# Patient Record
Sex: MALE | Race: WHITE | Employment: FULL TIME | URBAN - METROPOLITAN AREA
[De-identification: names, ages, dates, MRNs, and addresses within clinical notes are randomized per-mention and may not be internally consistent; named-entity substitution may affect disease eponyms.]

---

## 2020-06-16 ENCOUNTER — HOSPITAL ENCOUNTER (EMERGENCY)
Age: 50
Discharge: HOME | End: 2020-06-16
Attending: EMERGENCY MEDICINE
Payer: COMMERCIAL

## 2020-06-16 VITALS
SYSTOLIC BLOOD PRESSURE: 125 MMHG | TEMPERATURE: 98.8 F | WEIGHT: 175 LBS | RESPIRATION RATE: 18 BRPM | HEART RATE: 67 BPM | DIASTOLIC BLOOD PRESSURE: 81 MMHG | OXYGEN SATURATION: 97 %

## 2020-06-16 LAB
A/G RATIO: 1.3 (ref 1.1–2.2)
ALBUMIN SERPL-MCNC: 4.1 G/DL (ref 3.4–5)
ALP BLD-CCNC: 70 U/L (ref 40–129)
ALT SERPL-CCNC: 29 U/L (ref 10–40)
ANION GAP SERPL CALCULATED.3IONS-SCNC: 10 MMOL/L (ref 3–16)
AST SERPL-CCNC: 22 U/L (ref 15–37)
BASOPHILS ABSOLUTE: 0 K/UL (ref 0–0.2)
BASOPHILS RELATIVE PERCENT: 0.5 %
BILIRUB SERPL-MCNC: <0.2 MG/DL (ref 0–1)
BILIRUBIN URINE: NEGATIVE
BLOOD, URINE: NEGATIVE
BUN BLDV-MCNC: 17 MG/DL (ref 7–20)
CALCIUM SERPL-MCNC: 9.6 MG/DL (ref 8.3–10.6)
CHLORIDE BLD-SCNC: 104 MMOL/L (ref 99–110)
CLARITY: CLEAR
CO2: 25 MMOL/L (ref 21–32)
COLOR: YELLOW
CREAT SERPL-MCNC: 1 MG/DL (ref 0.9–1.3)
EOSINOPHILS ABSOLUTE: 0.1 K/UL (ref 0–0.6)
EOSINOPHILS RELATIVE PERCENT: 2 %
GFR AFRICAN AMERICAN: >60
GFR NON-AFRICAN AMERICAN: >60
GLOBULIN: 3.1 G/DL
GLUCOSE BLD-MCNC: 110 MG/DL (ref 70–99)
GLUCOSE URINE: NEGATIVE MG/DL
HCT VFR BLD CALC: 40.6 % (ref 40.5–52.5)
HEMOGLOBIN: 14 G/DL (ref 13.5–17.5)
KETONES, URINE: NEGATIVE MG/DL
LACTIC ACID: 1.3 MMOL/L (ref 0.4–2)
LEUKOCYTE ESTERASE, URINE: NEGATIVE
LYMPHOCYTES ABSOLUTE: 1.5 K/UL (ref 1–5.1)
LYMPHOCYTES RELATIVE PERCENT: 25.5 %
MCH RBC QN AUTO: 32.2 PG (ref 26–34)
MCHC RBC AUTO-ENTMCNC: 34.5 G/DL (ref 31–36)
MCV RBC AUTO: 93.3 FL (ref 80–100)
MICROSCOPIC EXAMINATION: NORMAL
MONOCYTES ABSOLUTE: 0.6 K/UL (ref 0–1.3)
MONOCYTES RELATIVE PERCENT: 10.4 %
NEUTROPHILS ABSOLUTE: 3.7 K/UL (ref 1.7–7.7)
NEUTROPHILS RELATIVE PERCENT: 61.6 %
NITRITE, URINE: NEGATIVE
PDW BLD-RTO: 13 % (ref 12.4–15.4)
PH UA: 6 (ref 5–8)
PLATELET # BLD: 245 K/UL (ref 135–450)
PMV BLD AUTO: 7 FL (ref 5–10.5)
POTASSIUM REFLEX MAGNESIUM: 3.8 MMOL/L (ref 3.5–5.1)
PROTEIN UA: NEGATIVE MG/DL
RBC # BLD: 4.35 M/UL (ref 4.2–5.9)
SODIUM BLD-SCNC: 139 MMOL/L (ref 136–145)
SPECIFIC GRAVITY UA: 1.02 (ref 1–1.03)
TOTAL PROTEIN: 7.2 G/DL (ref 6.4–8.2)
URINE REFLEX TO CULTURE: NORMAL
URINE TYPE: NORMAL
UROBILINOGEN, URINE: 0.2 E.U./DL
WBC # BLD: 6 K/UL (ref 4–11)

## 2020-06-16 PROCEDURE — 99283 EMERGENCY DEPT VISIT LOW MDM: CPT

## 2020-06-16 PROCEDURE — 2500000003 HC RX 250 WO HCPCS: Performed by: NURSE PRACTITIONER

## 2020-06-16 PROCEDURE — 81003 URINALYSIS AUTO W/O SCOPE: CPT

## 2020-06-16 PROCEDURE — 83605 ASSAY OF LACTIC ACID: CPT

## 2020-06-16 PROCEDURE — 80053 COMPREHEN METABOLIC PANEL: CPT

## 2020-06-16 PROCEDURE — 2580000003 HC RX 258: Performed by: NURSE PRACTITIONER

## 2020-06-16 PROCEDURE — 87040 BLOOD CULTURE FOR BACTERIA: CPT

## 2020-06-16 PROCEDURE — 96365 THER/PROPH/DIAG IV INF INIT: CPT

## 2020-06-16 PROCEDURE — 85025 COMPLETE CBC W/AUTO DIFF WBC: CPT

## 2020-06-16 RX ORDER — 0.9 % SODIUM CHLORIDE 0.9 %
1000 INTRAVENOUS SOLUTION INTRAVENOUS ONCE
Status: DISCONTINUED | OUTPATIENT
Start: 2020-06-16 | End: 2020-06-16

## 2020-06-16 RX ORDER — DOXYCYCLINE HYCLATE 100 MG
TABLET ORAL
COMMUNITY
Start: 2020-06-15

## 2020-06-16 RX ORDER — CLINDAMYCIN HYDROCHLORIDE 150 MG/1
300 CAPSULE ORAL 4 TIMES DAILY
Qty: 56 CAPSULE | Refills: 0 | Status: SHIPPED | OUTPATIENT
Start: 2020-06-16 | End: 2020-06-23

## 2020-06-16 RX ADMIN — DEXTROSE MONOHYDRATE 600 MG: 50 INJECTION, SOLUTION INTRAVENOUS at 23:00

## 2020-06-16 ASSESSMENT — ENCOUNTER SYMPTOMS
SHORTNESS OF BREATH: 0
ABDOMINAL PAIN: 0
BACK PAIN: 0
COLOR CHANGE: 1
DIARRHEA: 0
COUGH: 0
VOMITING: 0
NAUSEA: 0
WHEEZING: 0

## 2020-06-17 NOTE — ED PROVIDER NOTES
**[facility]ERS[facility]  [facility]  EMERGENCY DEPARTMENT ENCOUNTER      Pt Name: Elba FOX:[id number]  [place] 1970  Date of evaluation: 6/16/2020  Provider: JON Frost CNP      Chief Complaint:    Chief Complaint   Patient presents with    Wound Check     pt has cellulitis to L upper leg, was on bactrim and switched to doxycycline, been on doxy for two days and seems to be getting worse        Nursing Notes, Past Medical Hx, Past Surgical Hx, Social Hx, Allergies, and Family Hx were all reviewed and agreed with or any disagreements were addressed in the HPI.    HPI:  (Location, Duration, Timing, Severity, Quality, Assoc Sx, Context, Modifying factors)  This is a  44 y.o. male who presents emergency department with cellulitis to the left upper medial thigh, patient states that he has had it for the past 5 days, he was started on Bactrim initially however two days ago was changed over to doxycycline. There was concern by the PCP that the cellulitis is only getting worse. He states initially that he thought it was just a bug bite as the area was about the size of a golf ball however it is now spread to the entire medial thigh, to the middle aspect of his anterior thigh and spreads to the posterior thigh, does not involve the lateral aspect of his left thigh, no involvement of the knee. No redness erythema or involvement of the inguinal area, scrotal region or penis. He denies any pain on exam.  No fever or chills, no cough or congestion. No nausea vomiting or diarrhea. PCP was concerned that the cellulitis is not getting better and failing outpatient treatment. He denies any additional complaints, no additional aggravating or relieving factors. Patient presents awake, alert and in no acute distress or toxic appearance. PastMedical/Surgical History:  History reviewed. No pertinent past medical history. History reviewed.  No less than 2 seconds. Coloration: Skin is not pale. Findings: Erythema present. Comments: Patient has erythema and warmth to the entire medial thigh, to the middle aspect of his anterior thigh and spreads to the posterior thigh, does not involve the lateral aspect of his left thigh, no involvement of the knee. No redness erythema or involvement of the inguinal area, scrotal region or penis. He denies any pain on exam.  No vesicles or pustules. No open wounds or lacerations. Neurological:      General: No focal deficit present. Mental Status: He is alert and oriented to person, place, and time.    Psychiatric:         Mood and Affect: Mood normal.         Behavior: Behavior normal.         MEDICAL DECISION MAKING    Vitals:    Vitals:    06/16/20 2227 06/16/20 2228 06/16/20 2229 06/16/20 2336   BP:  (!) 147/92  125/81   Pulse:  74  67   Resp:  18  18   Temp:   98.8 °F (37.1 °C)    TempSrc:   Oral    SpO2:  98%  97%   Weight: 175 lb (79.4 kg)          LABS:  Labs Reviewed   COMPREHENSIVE METABOLIC PANEL W/ REFLEX TO MG FOR LOW K - Abnormal; Notable for the following components:       Result Value    Glucose 110 (*)     All other components within normal limits    Narrative:     Performed at:  [place] [address] [address]   Phone [phone]   CULTURE, BLOOD 1   CULTURE, BLOOD 2   CBC WITH AUTO DIFFERENTIAL    Narrative:     Performed at:  [place] [address] [address]   Phone [phone]   LACTIC ACID, PLASMA    Narrative:     Performed at:  [place] [address] [address]   Phone [phone]   URINE RT REFLEX TO CULTURE    Narrative:     Performed at:  [place] [address] [address]   Phone  of labs reviewed and werenegative at this time or not returned at the time of this note. RADIOLOGY:   Non-plain film images such as CT, Ultrasound and MRI are read by the radiologist. Kellen FALL, JON - CNP have directly visualized the radiologic plain film image(s) with the below findings:        Interpretation per the Radiologist below, if available at the time of this note:    No orders to display        No results found. MEDICAL DECISION MAKING / ED COURSE:      PROCEDURES:   Procedures    None    Patient was given:  Medications   clindamycin (CLEOCIN) 600 mg in dextrose 5% 50 mL IVPB (0 mg Intravenous Stopped 6/16/20 1165)       Patient complains of cellulitis to the left medial thigh, has been dealing with this for the past week, has been on Bactrim and now on doxycycline but not getting any better. After evaluation and examination patient I ordered IV access, IV fluids, blood work, and urinalysis and clindamycin. He has no pain on exam.  No involvement of the perineum, scrotum or penis, little concern for perineal abscess. CBC shows no acute sepsis or anemia. Metabolic panel shows no acute electrolyte disturbances or renal insufficiency. Liver functions are normal.  Lactic acid 1.3. Urinalysis no acute infection. I did order blood cultures on the patient. He was given IV dose of clindamycin. I do believe he can be managed on outpatient basis. However, I also agreed with him that he needs to continue on the doxycycline. The patient is otherwise not clinically toxic nor febrile. Differential diagnosis includes but is not limited to erysipelas, osteomyelitis, septic arthritis, lymphadenitis, DVT and necrotizing fasciitis. The appearance of the infection is such that I believe he will improve with oral antibiotics. Therefore, shared medical decision was made to the patient myself and we agreed the patient could be discharged home with outpatient follow-up.   Patient was discharged home with prescription for clindamycin, educated to continue doxycycline as well take all medicine as prescribed. He was advised to follow-up with their family doctor within the next 24-48 hours and return to the ED if there is no improvement or if the infection starts to worsen in any way. The patient tolerated their visit well. I evaluated the patient. The physician was available for consultation as needed. The patient and / or the family were informed of the results of any tests, a time was given to answer questions, a plan was proposed and they agreed with plan. The patient all verbalized understanding of discharge instructions and the patient was discharged from the department in stable condition. CLINICAL IMPRESSION:  1.  Left leg cellulitis        DISPOSITION        PATIENT REFERRED TO:  Cesia Krause MD  [address]    Schedule an appointment as soon as possible for a visit in 2 days  Follow-up with PCP in 2 days for reevaluation    [facility]  ED  [address] [facility]  Go to   If symptoms worsen      DISCHARGE MEDICATIONS:  Discharge Medication List as of 6/16/2020 11:33 PM      START taking these medications    Details   clindamycin (CLEOCIN) 150 MG capsule Take 2 capsules by mouth 4 times daily for 7 days, Disp-56 capsule, R-0Print             DISCONTINUED MEDICATIONS:  Discharge Medication List as of 6/16/2020 11:33 PM                 (Please note the MDM and HPI sections of this note were completed with a voice recognition program.  Efforts were made to edit the dictations but occasionally words are mis-transcribed.)    Electronically signed, JON Stephen CNP,           JON Stephen CNP  06/16/20 1610

## 2020-06-20 LAB
BLOOD CULTURE, ROUTINE: NORMAL
CULTURE, BLOOD 2: NORMAL